# Patient Record
Sex: MALE | Race: WHITE | HISPANIC OR LATINO | URBAN - NONMETROPOLITAN AREA
[De-identification: names, ages, dates, MRNs, and addresses within clinical notes are randomized per-mention and may not be internally consistent; named-entity substitution may affect disease eponyms.]

---

## 2023-04-07 ENCOUNTER — OFFICE (OUTPATIENT)
Dept: URBAN - NONMETROPOLITAN AREA CLINIC 13 | Facility: CLINIC | Age: 33
End: 2023-04-07

## 2023-04-07 VITALS
HEART RATE: 73 BPM | OXYGEN SATURATION: 98 % | HEIGHT: 67 IN | SYSTOLIC BLOOD PRESSURE: 111 MMHG | DIASTOLIC BLOOD PRESSURE: 71 MMHG | WEIGHT: 153 LBS

## 2023-04-07 DIAGNOSIS — K21.9 GASTRO-ESOPHAGEAL REFLUX DISEASE WITHOUT ESOPHAGITIS: ICD-10-CM

## 2023-04-07 DIAGNOSIS — R10.13 EPIGASTRIC PAIN: ICD-10-CM

## 2023-04-07 LAB
AMYLASE: 88 UNITS/L (ref 30–110)
CBC WITH WBC (DIFF): ACANTHOCYTE: NORMAL
CBC WITH WBC (DIFF): AGRANULAR NEUTROPHIL: NORMAL
CBC WITH WBC (DIFF): ANISOCYTOSIS: NORMAL
CBC WITH WBC (DIFF): ATYPICAL LYMPHOCYTE: NORMAL
CBC WITH WBC (DIFF): AUER RODS: NORMAL
CBC WITH WBC (DIFF): BAND: NORMAL
CBC WITH WBC (DIFF): BASOPHIL: 0 % (ref 0–1)
CBC WITH WBC (DIFF): BASOPHILIC STIPPLING: NORMAL
CBC WITH WBC (DIFF): BI-LOBED NEUTROPHIL: NORMAL
CBC WITH WBC (DIFF): BLAST: NORMAL
CBC WITH WBC (DIFF): BURR CELL: NORMAL
CBC WITH WBC (DIFF): DIMORPHIC RBC POPULATION: NORMAL
CBC WITH WBC (DIFF): DOHLE BODIES: NORMAL
CBC WITH WBC (DIFF): ELLIPTOCYTE: NORMAL
CBC WITH WBC (DIFF): EOSINOPHIL: 2 % (ref 0–5)
CBC WITH WBC (DIFF): GIANT PLATELET: NORMAL
CBC WITH WBC (DIFF): HEMATOCRIT: 44.1 % (ref 41–53)
CBC WITH WBC (DIFF): HEMOGLOBIN: 14.4 G/DL (ref 14–18)
CBC WITH WBC (DIFF): HOWELL JOLLY BODIES: NORMAL
CBC WITH WBC (DIFF): HYPERSEGMENTED NEUTROPHIL: NORMAL
CBC WITH WBC (DIFF): HYPOCHROMIA: NORMAL
CBC WITH WBC (DIFF): HYPOGRANULAR NEUTROPHIL: NORMAL
CBC WITH WBC (DIFF): IMMATURE GRANULOCYTES: 0 % (ref 0–1)
CBC WITH WBC (DIFF): LARGE PLATELET: NORMAL
CBC WITH WBC (DIFF): LYMPHOCYTE: 20 % (ref 20–40)
CBC WITH WBC (DIFF): MACROCYTOSIS: NORMAL
CBC WITH WBC (DIFF): MEAN CELL HEMOGLOBIN CONCENTRATION: 32.7 G/DL — LOW (ref 33–37)
CBC WITH WBC (DIFF): MEAN CELL HEMOGLOBIN: 28.9 PG (ref 27–31)
CBC WITH WBC (DIFF): MEAN CELL VOLUME: 88 FL (ref 84–100)
CBC WITH WBC (DIFF): MEAN PLATELET VOLUME: 11.3 FL (ref 8.3–11.9)
CBC WITH WBC (DIFF): METAMYELOCYTE: NORMAL
CBC WITH WBC (DIFF): MICROCYTOSIS: NORMAL
CBC WITH WBC (DIFF): MIX CELLS: NORMAL
CBC WITH WBC (DIFF): MONOCYTE: 7 % (ref 1–10)
CBC WITH WBC (DIFF): MYELOCYTE: NORMAL
CBC WITH WBC (DIFF): NEUTROPHIL SEGMENTED: 69 % (ref 50–70)
CBC WITH WBC (DIFF): NOTE: NORMAL
CBC WITH WBC (DIFF): NUCLEATED RBC: 0 /100WBC (ref 0–0)
CBC WITH WBC (DIFF): OVALOCYTE: NORMAL
CBC WITH WBC (DIFF): PAPPENHEIMER BODIES: NORMAL
CBC WITH WBC (DIFF): PATHOLOGIST INTERPRETATION: NORMAL
CBC WITH WBC (DIFF): PLATELET CLUMPS: NORMAL
CBC WITH WBC (DIFF): PLATELET COUNT: 266 /NL (ref 140–440)
CBC WITH WBC (DIFF): PLT SATELLITOSIS: NORMAL
CBC WITH WBC (DIFF): POIKILOCYTOSIS: NORMAL
CBC WITH WBC (DIFF): POLYCHROMASIA: NORMAL
CBC WITH WBC (DIFF): PROMYELOCYTE: NORMAL
CBC WITH WBC (DIFF): RBC MORPHOLOGY: NORMAL
CBC WITH WBC (DIFF): REACT LYMPH ABS MAN: NORMAL
CBC WITH WBC (DIFF): REACTIVE LYMPHOCYTE: NORMAL
CBC WITH WBC (DIFF): RED BLOOD CELL: 4.99 /PL (ref 4.7–6.1)
CBC WITH WBC (DIFF): RED CELL DIAMETER WIDTH: 49 FL — HIGH (ref 35–48)
CBC WITH WBC (DIFF): ROULEAUX RBC: NORMAL
CBC WITH WBC (DIFF): SCHISTOCYTE: NORMAL
CBC WITH WBC (DIFF): SICKLE CELL: NORMAL
CBC WITH WBC (DIFF): SMUDGE CELLS: NORMAL
CBC WITH WBC (DIFF): SPHEROCYTE: NORMAL
CBC WITH WBC (DIFF): STOMATOCYTE: NORMAL
CBC WITH WBC (DIFF): TARGET CELL: NORMAL
CBC WITH WBC (DIFF): TEAR DROP CELL: NORMAL
CBC WITH WBC (DIFF): TOXIC GRANULATION: NORMAL
CBC WITH WBC (DIFF): UNCLASSIFIED CELL: NORMAL
CBC WITH WBC (DIFF): VACUOLATED NEUTROPHIL: NORMAL
CBC WITH WBC (DIFF): WBC MORPHOLOGY: NORMAL
CBC WITH WBC (DIFF): WHITE BLOOD CELL: 9.6 /NL (ref 4.8–11)
COMPREHENSIVE METABOLIC PANEL: ALBUMIN: 4 G/DL (ref 3.5–4.8)
COMPREHENSIVE METABOLIC PANEL: ALKALINE PHOSPHATASE: 67 UNITS/L (ref 36–126)
COMPREHENSIVE METABOLIC PANEL: ALT: 21 UNITS/L (ref ?–49)
COMPREHENSIVE METABOLIC PANEL: ANION GAP: 5 MMOL/L — LOW (ref 7–16)
COMPREHENSIVE METABOLIC PANEL: AST: 22 UNITS/L (ref 17–59)
COMPREHENSIVE METABOLIC PANEL: BILIRUBIN, TOTAL: 0.4 MG/DL (ref 0.2–1.3)
COMPREHENSIVE METABOLIC PANEL: BUN/CREATININE RATIO: 23.4
COMPREHENSIVE METABOLIC PANEL: CALCIUM: 9.1 MG/DL (ref 8.4–10.2)
COMPREHENSIVE METABOLIC PANEL: CARBON DIOXIDE: 31 MMOL/L — HIGH (ref 22–30)
COMPREHENSIVE METABOLIC PANEL: CHLORIDE: 107 MMOL/L (ref 98–107)
COMPREHENSIVE METABOLIC PANEL: CREATININE: 0.77 MG/DL (ref 0.66–1.25)
COMPREHENSIVE METABOLIC PANEL: GLUCOSE: 66 MG/DL — LOW (ref 75–110)
COMPREHENSIVE METABOLIC PANEL: POTASSIUM: 4.6 MMOL/L (ref 3.5–5.3)
COMPREHENSIVE METABOLIC PANEL: PROTEIN, TOTAL, SERUM: 6.8 G/DL (ref 6.3–8.2)
COMPREHENSIVE METABOLIC PANEL: SODIUM: 143 MMOL/L (ref 137–145)
COMPREHENSIVE METABOLIC PANEL: UREA NITROGEN (BUN): 18 MG/DL (ref 9–20)
GFR: EGFR AFRICAN AMERICAN: >60 ML/MIN/1.73M2
GFR: EGFR NON-AFR.AMERICAN: >60 ML/MIN/1.73M2
LIPASE: 83 UNITS/L (ref 23–300)

## 2023-04-07 PROCEDURE — 99203 OFFICE O/P NEW LOW 30 MIN: CPT | Performed by: NURSE PRACTITIONER

## 2024-06-11 ENCOUNTER — HOSPITAL ENCOUNTER (EMERGENCY)
Facility: HOSPITAL | Age: 34
Discharge: HOME | End: 2024-06-11
Attending: EMERGENCY MEDICINE

## 2024-06-11 VITALS
RESPIRATION RATE: 20 BRPM | HEIGHT: 66 IN | TEMPERATURE: 99 F | HEART RATE: 71 BPM | SYSTOLIC BLOOD PRESSURE: 122 MMHG | WEIGHT: 141.63 LBS | BODY MASS INDEX: 22.76 KG/M2 | OXYGEN SATURATION: 96 % | DIASTOLIC BLOOD PRESSURE: 65 MMHG

## 2024-06-11 DIAGNOSIS — A08.4 VIRAL GASTROENTERITIS: Primary | ICD-10-CM

## 2024-06-11 DIAGNOSIS — R19.7 NAUSEA, VOMITING, AND DIARRHEA: ICD-10-CM

## 2024-06-11 DIAGNOSIS — R11.2 NAUSEA, VOMITING, AND DIARRHEA: ICD-10-CM

## 2024-06-11 PROCEDURE — 99284 EMERGENCY DEPT VISIT MOD MDM: CPT

## 2024-06-11 PROCEDURE — 25000003 PHARM REV CODE 250

## 2024-06-11 RX ORDER — DICYCLOMINE HYDROCHLORIDE 20 MG/1
20 TABLET ORAL 2 TIMES DAILY PRN
Qty: 30 TABLET | Refills: 0 | Status: SHIPPED | OUTPATIENT
Start: 2024-06-11

## 2024-06-11 RX ORDER — ONDANSETRON 4 MG/1
4 TABLET, ORALLY DISINTEGRATING ORAL
Status: COMPLETED | OUTPATIENT
Start: 2024-06-11 | End: 2024-06-11

## 2024-06-11 RX ORDER — ONDANSETRON 4 MG/1
4 TABLET, ORALLY DISINTEGRATING ORAL EVERY 6 HOURS PRN
Qty: 15 TABLET | Refills: 0 | OUTPATIENT
Start: 2024-06-11 | End: 2024-06-21

## 2024-06-11 RX ORDER — ALUMINUM HYDROXIDE, MAGNESIUM HYDROXIDE, AND SIMETHICONE 1200; 120; 1200 MG/30ML; MG/30ML; MG/30ML
30 SUSPENSION ORAL ONCE
Status: COMPLETED | OUTPATIENT
Start: 2024-06-11 | End: 2024-06-11

## 2024-06-11 RX ORDER — ALUMINUM HYDROXIDE, MAGNESIUM HYDROXIDE, AND SIMETHICONE 2400; 240; 2400 MG/30ML; MG/30ML; MG/30ML
10 SUSPENSION ORAL EVERY 6 HOURS PRN
Qty: 100 ML | Refills: 0 | Status: SHIPPED | OUTPATIENT
Start: 2024-06-11 | End: 2025-06-11

## 2024-06-11 RX ORDER — LIDOCAINE HYDROCHLORIDE 20 MG/ML
15 SOLUTION OROPHARYNGEAL ONCE
Status: COMPLETED | OUTPATIENT
Start: 2024-06-11 | End: 2024-06-11

## 2024-06-11 RX ORDER — DICYCLOMINE HYDROCHLORIDE 10 MG/1
20 CAPSULE ORAL
Status: COMPLETED | OUTPATIENT
Start: 2024-06-11 | End: 2024-06-11

## 2024-06-11 RX ADMIN — ONDANSETRON 4 MG: 4 TABLET, ORALLY DISINTEGRATING ORAL at 05:06

## 2024-06-11 RX ADMIN — DICYCLOMINE HYDROCHLORIDE 20 MG: 10 CAPSULE ORAL at 05:06

## 2024-06-11 RX ADMIN — ALUMINUM HYDROXIDE, MAGNESIUM HYDROXIDE, AND SIMETHICONE 30 ML: 200; 200; 20 SUSPENSION ORAL at 05:06

## 2024-06-11 RX ADMIN — LIDOCAINE HYDROCHLORIDE 15 ML: 20 SOLUTION ORAL at 05:06

## 2024-06-11 NOTE — ED PROVIDER NOTES
Encounter Date: 6/11/2024       History     Chief Complaint   Patient presents with    Abdominal Pain    Nausea    Diarrhea    Vomiting     Pt presents to ER with complaints of abdominal pain (6/10) nausea vomiting and diarrhea. Pt denies CP and SOB. Pt states he took pepto but has had no relief. Pt denies any other issues at the time.      33-year-old male with no past medical history presents to the ED for nausea, vomiting, and diarrhea.  Patient states this started yesterday.  Patient complaining of epigastric cramping pain that is intermittent, he rates a 6/10.  He is tried Pepto-Bismol with little relief.  Denies anything making it worse.  Patient denies any sick contacts.  Patient denies any previous abdominal surgeries.  Patient admits to nausea, vomiting x6, and diarrhea x6.  Patient denies fever, sweats, chills, congestion, runny nose, sore throat, cough, shortness breath, chest pain, hematemesis, hematochezia, urinary symptoms, body aches, headaches, dizziness, and lightheadedness.      Review of patient's allergies indicates:  No Known Allergies  History reviewed. No pertinent past medical history.  History reviewed. No pertinent surgical history.  No family history on file.  Social History     Tobacco Use    Smoking status: Every Day     Types: Cigarettes    Smokeless tobacco: Never   Substance Use Topics    Alcohol use: Never    Drug use: Never     Review of Systems   Constitutional:  Negative for chills, diaphoresis and fever.   HENT:  Negative for congestion, rhinorrhea and sore throat.    Respiratory:  Negative for cough and shortness of breath.    Cardiovascular:  Negative for chest pain.   Gastrointestinal:  Positive for abdominal pain, diarrhea, nausea and vomiting. Negative for blood in stool and constipation.        (-) hematemesis   Genitourinary:  Negative for decreased urine volume, difficulty urinating, dysuria, frequency, hematuria and urgency.   Musculoskeletal:  Negative for myalgias.    Neurological:  Negative for dizziness, weakness, light-headedness and headaches.       Physical Exam     Initial Vitals [06/11/24 1640]   BP Pulse Resp Temp SpO2   122/65 71 20 98.5 °F (36.9 °C) 96 %      MAP       --         Physical Exam    Nursing note and vitals reviewed.  Constitutional: Vital signs are normal. He appears well-developed and well-nourished. He is not diaphoretic. He is active. He does not appear ill. No distress.   No vomiting in room   HENT:   Head: Normocephalic and atraumatic.   Right Ear: External ear normal.   Left Ear: External ear normal.   Nose: Nose normal.   Eyes: Conjunctivae, EOM and lids are normal. Pupils are equal, round, and reactive to light. Right eye exhibits no discharge. Left eye exhibits no discharge. No scleral icterus.   Neck: Phonation normal. Neck supple.   Normal range of motion.   Full passive range of motion without pain.     Cardiovascular:  Normal rate and regular rhythm.           Pulmonary/Chest: Effort normal and breath sounds normal. No respiratory distress.   Abdominal: Abdomen is soft. He exhibits no distension and no mass. There is abdominal tenderness in the epigastric area. There is no rebound and no guarding.   Musculoskeletal:         General: Normal range of motion.      Cervical back: Full passive range of motion without pain, normal range of motion and neck supple.     Neurological: He is alert and oriented to person, place, and time. He has normal strength. GCS eye subscore is 4. GCS verbal subscore is 5. GCS motor subscore is 6.   Skin: Skin is dry. Capillary refill takes less than 2 seconds.         ED Course   Procedures  Labs Reviewed - No data to display       Imaging Results    None          Medications   dicyclomine capsule 20 mg (20 mg Oral Given 6/11/24 1739)   ondansetron disintegrating tablet 4 mg (4 mg Oral Given 6/11/24 1740)   aluminum-magnesium hydroxide-simethicone 200-200-20 mg/5 mL suspension 30 mL (30 mLs Oral Given 6/11/24 1740)      And   LIDOcaine viscous HCl 2% oral solution 15 mL (15 mLs Oral Given 6/11/24 1740)     Medical Decision Making  33-year-old male with no past medical history presents to the ED for nausea, vomiting, and diarrhea.  Patient's chart and medical history reviewed.    Ddx:  Cholecystitis  Pancreatitis  Viral gastroenteritis  GERD  SBO  Colitis  Appendicitis    Patient's vitals reviewed.  Afebrile, no respiratory distress, and nontoxic-appearing in the ED. patient had mild epigastric tenderness palpation.  No guarding or rebound tenderness.  Patient would not like labs or IV today.  Patient given a GI cocktail, Zofran, and Bentyl for symptomatic control.  Patient states he is feeling much better.  Patient tolerated p.o. challenge.  Discussed with patient this is most likely a viral gastroenteritis which will take time to flush from his system.  Considered but unlikely appendicitis, no right lower quadrant pain, overall well-appearing, no fever.  Considered unlikely cholecystitis, no right upper quadrant pain, no fever, and overall well-appearing.  Considered but unlikely small-bowel obstruction, patient is still having bowel movements and passing gas.  Instructed patient to rest, stay well hydrated, and initiate a soft bowel diet, he verbalized understanding.  Patient be sent home with Bentyl, Zofran, and Mylanta for symptomatic control.  Patient will follow-up with his PCP. Patient agrees with this plan. Discussed with him strict return precautions, he verbalized understanding. Patient is stable for discharge.     Risk  OTC drugs.  Prescription drug management.                                      Clinical Impression:  Final diagnoses:  [A08.4] Viral gastroenteritis (Primary)  [R11.2, R19.7] Nausea, vomiting, and diarrhea          ED Disposition Condition    Discharge Stable          ED Prescriptions       Medication Sig Dispense Start Date End Date Auth. Provider    dicyclomine (BENTYL) 20 mg tablet Take 1 tablet  (20 mg total) by mouth 2 (two) times daily as needed (Abdominal pain). 30 tablet 6/11/2024 -- Holdsworth, Alayna, PA-C    ondansetron (ZOFRAN-ODT) 4 MG TbDL Take 1 tablet (4 mg total) by mouth every 6 (six) hours as needed (Zofran). 15 tablet 6/11/2024 -- Holdsworth, Alayna, PA-C    aluminum & magnesium hydroxide-simethicone (MYLANTA MAXIMUM STRENGTH) 400-400-40 mg/5 mL suspension Take 10 mLs by mouth every 6 (six) hours as needed for Indigestion. 100 mL 6/11/2024 6/11/2025 Holdsworth, Alayna, PA-C          Follow-up Information       Follow up With Specialties Details Why Contact Info    [facility] - Emergency Dept Emergency Medicine  If symptoms worsen [address] [facility] [place] [address] [phone]             Holdsworth, Alayna, PA-C  06/11/24 4313

## 2024-06-11 NOTE — DISCHARGE INSTRUCTIONS
Amber por venir a nuestro Departamento de Emergencias hoy. Es importante recordar que algunos problemas o condiciones médicas son difíciles de diagnosticar y es posible que no se encuentren o aborden rosa lopez visita al Departamento de Emergencias. Estas condiciones a menudo comienzan con síntomas inespecíficos y solo se pueden diagnosticar en visitas de seguimiento con lopez médico de atención primaria o especialista cuando los síntomas continúan o cambian. Recuerde que todas las condiciones médicas pueden cambiar y no podemos predecir cómo se sentirá mañana o al día siguiente. Regrese a la chelsea de emergencias si tiene preguntas/inquietudes, síntomas nuevos/preocupantes, empeoramiento o falta de mejora.    Asegúrese de hacer un seguimiento con lopez médico de atención primaria y revisar con ellos todos los laboratorios/imágenes/pruebas que se realizaron rosa lopez visita a la chelsea de emergencias. Es muy común que identifiquemos hallazgos incidentales no emergentes que deben ser objeto de seguimiento con lopez médico de atención primaria. Algunos laboratorios/imágenes/pruebas pueden estar fuera del rango normal y requieren un seguimiento que no sea de emergencia y/o más investigación/tratamiento/procedimientos/pruebas para ayudar a diagnosticar/excluir/prevenir complicaciones u otras afecciones médicas potencialmente graves. Algunas anormalidades pueden no lamberto sido discutidas o abordadas rosa lopez visita a la chelsea de emergencias. Es posible que algunos resultados de laboratorio no regresen rosa lopez visita a la chelsea de emergencias, gaviota se puede acceder a ellos descargando la aplicación gratuita [facility] o visitando [email or web address] Es importante que revise con lopez médico todas las pruebas de laboratorio/imágenes/pruebas que estén fuera del rango normal.    Kasia visita a la chelsea de emergencias no reemplaza kasia visita de atención primaria, y muchas pruebas de detección o de seguimiento no pueden ser  ordenadas por un médico de emergencia ni realizadas por la chelsea de emergencias. Algunas pruebas pueden incluso requerir aprobación previa.    Si no tiene un médico de atención primaria, puede comunicarse con el que figura en la documentación del carla o también puede llamar al mostrador de citas de la [facility] al [phone] o a [address] [place] [address] al [phone] para programar kasia katelyn. katelyn, o establecer atención con un médico de atención primaria o incluso un especialista y para obtener información sobre los recursos locales. Es importante para lopez liliana que tenga un médico de atención primaria.    [place] todos los medicamentos según las indicaciones. Hemos hecho todo lo posible para seleccionar un medicamento para usted que tratará lopez condición; sin embargo, todos los medicamentos pueden tener efectos secundarios y es imposible predecir qué medicamentos pueden causarle efectos secundarios o cuáles (si los hay) esos medicamentos le pueden alis. Si siente que está teniendo un efecto negativo o un efecto secundario de algún medicamento, debe dejar de zoltan esos medicamentos de inmediato y buscar atención médica. Si siente que está teniendo kasia reacción potencialmente mortal, llame al 911.    No conduzca, nade, suba a [place], se bañe, opere maquinaria pesada, gale alcohol o tome medicamentos potencialmente sedantes, firme ningún documento legal o tome decisiones importantes rosa 24 horas si ha recibido algún medicamento para el dolor, sedantes o alteradores del estado de ánimo. medicamentos rosa lopez visita a la chelsea de emergencias o dentro de las 24 horas de haberlos tomado si se los colon recetado.    Puede encontrar recursos adicionales para dentistas, audífonos, equipos médicos duraderos, farmacias de bajo costo y otros recursos en [email or web address]

## 2024-06-21 ENCOUNTER — HOSPITAL ENCOUNTER (EMERGENCY)
Facility: HOSPITAL | Age: 34
Discharge: HOME | End: 2024-06-21
Attending: EMERGENCY MEDICINE

## 2024-06-21 VITALS
SYSTOLIC BLOOD PRESSURE: 109 MMHG | TEMPERATURE: 99 F | DIASTOLIC BLOOD PRESSURE: 59 MMHG | HEIGHT: 66 IN | OXYGEN SATURATION: 99 % | HEART RATE: 72 BPM | RESPIRATION RATE: 18 BRPM | WEIGHT: 141 LBS | BODY MASS INDEX: 22.66 KG/M2

## 2024-06-21 DIAGNOSIS — U07.1 COVID-19: Primary | ICD-10-CM

## 2024-06-21 LAB
CTP QC/QA: YES
MOLECULAR STREP A: NEGATIVE
POC MOLECULAR INFLUENZA A AGN: NEGATIVE
POC MOLECULAR INFLUENZA B AGN: NEGATIVE
SARS-COV-2 RDRP RESP QL NAA+PROBE: POSITIVE

## 2024-06-21 PROCEDURE — 87502 INFLUENZA DNA AMP PROBE: CPT

## 2024-06-21 PROCEDURE — 87635 SARS-COV-2 COVID-19 AMP PRB: CPT

## 2024-06-21 PROCEDURE — 87651 STREP A DNA AMP PROBE: CPT

## 2024-06-21 PROCEDURE — 99284 EMERGENCY DEPT VISIT MOD MDM: CPT

## 2024-06-21 PROCEDURE — 25000003 PHARM REV CODE 250: Performed by: STUDENT IN AN ORGANIZED HEALTH CARE EDUCATION/TRAINING PROGRAM

## 2024-06-21 RX ORDER — ACETAMINOPHEN 500 MG
1000 TABLET ORAL
Status: COMPLETED | OUTPATIENT
Start: 2024-06-21 | End: 2024-06-21

## 2024-06-21 RX ORDER — BENZONATATE 100 MG/1
100 CAPSULE ORAL 3 TIMES DAILY PRN
Qty: 20 CAPSULE | Refills: 0 | Status: SHIPPED | OUTPATIENT
Start: 2024-06-21 | End: 2024-07-01

## 2024-06-21 RX ORDER — IBUPROFEN 800 MG/1
800 TABLET ORAL EVERY 6 HOURS PRN
Qty: 20 TABLET | Refills: 0 | Status: SHIPPED | OUTPATIENT
Start: 2024-06-21

## 2024-06-21 RX ORDER — ACETAMINOPHEN 500 MG
1000 TABLET ORAL EVERY 6 HOURS PRN
Qty: 20 TABLET | Refills: 0 | Status: SHIPPED | OUTPATIENT
Start: 2024-06-21 | End: 2024-06-26

## 2024-06-21 RX ORDER — ONDANSETRON 4 MG/1
4 TABLET, ORALLY DISINTEGRATING ORAL 2 TIMES DAILY
Qty: 12 TABLET | Refills: 0 | Status: SHIPPED | OUTPATIENT
Start: 2024-06-21

## 2024-06-21 RX ADMIN — ACETAMINOPHEN 1000 MG: 500 TABLET ORAL at 09:06

## 2024-06-21 NOTE — Clinical Note
"Christian"Tomasa Lockhart was seen and treated in our emergency department on 6/21/2024.     COVID-19 is present in our communities across the state. There is limited testing for COVID at this time, so not all patients can be tested. In this situation, your employee meets the following criteria:    Christian Lockhart has met the criteria for COVID-19 testing and has a POSITIVE result. He can return to work once they are asymptomatic for 24 hours without the use of fever reducing medications AND at least five days from the first positive result. A mask is recommended for 5 days post quarantine.     If you have any questions or concerns, or if I can be of further assistance, please do not hesitate to contact me.    Sincerely,             Clement Interiano MD"

## 2024-06-22 NOTE — ED PROVIDER NOTES
Encounter Date: 6/21/2024       History     Chief Complaint   Patient presents with    COVID-19 Concerns     C/o subjective fever, cough, chills, body aches x1 day.  Productive cough (green/yellow). Exposed to co-worker that was covid positive.      33-year-old male without significant past medical history presents emergency room for evaluation of 2 days of cough, congestion, diarrhea, fever and fatigue with associated body aches.  Cough is nonproductive COVID, no chronic lung disease.  Chest pain or shortness breath.  No abdominal pain.  No nausea or vomiting.    The history is provided by the patient. No  was used.     Review of patient's allergies indicates:  No Known Allergies  No past medical history on file.  No past surgical history on file.  No family history on file.  Social History     Tobacco Use    Smoking status: Every Day     Types: Cigarettes    Smokeless tobacco: Never   Substance Use Topics    Alcohol use: Never    Drug use: Never     Review of Systems   Constitutional:  Positive for fatigue and fever. Negative for chills.   HENT:  Positive for congestion. Negative for hearing loss, sore throat and trouble swallowing.    Eyes:  Negative for visual disturbance.   Respiratory:  Positive for cough. Negative for chest tightness and shortness of breath.    Cardiovascular:  Negative for chest pain.   Gastrointestinal:  Positive for diarrhea. Negative for abdominal pain and nausea.   Endocrine: Negative for polyuria.   Genitourinary:  Negative for difficulty urinating.   Musculoskeletal:  Positive for myalgias. Negative for arthralgias.   Skin:  Negative for rash.   Neurological:  Negative for dizziness and headaches.   Psychiatric/Behavioral:  The patient is not nervous/anxious.        Physical Exam     Initial Vitals [06/21/24 2030]   BP Pulse Resp Temp SpO2   (!) 109/59 72 18 99.2 °F (37.3 °C) 99 %      MAP       --         Physical Exam    Nursing note and vitals  reviewed.  Constitutional: He appears well-developed and well-nourished.   HENT:   Head: Normocephalic and atraumatic.   Bilateral nares boggy with clear mucoid discharge.    Posterior oropharynx erythematous without evidence mass or swelling.  Bilateral TM, EAC unremarkable.   Eyes: Conjunctivae and EOM are normal.   Neck: Neck supple.   Cardiovascular:  Normal rate, regular rhythm, normal heart sounds and intact distal pulses.           Pulmonary/Chest: Breath sounds normal. No respiratory distress.   Abdominal: Abdomen is soft. He exhibits no distension. There is no abdominal tenderness.   Musculoskeletal:         General: Normal range of motion.      Cervical back: Neck supple.     Neurological: He is alert and oriented to person, place, and time. GCS score is 15. GCS eye subscore is 4. GCS verbal subscore is 5. GCS motor subscore is 6.   Skin: Skin is warm and dry. Capillary refill takes less than 2 seconds.   Psychiatric: He has a normal mood and affect. His behavior is normal. Judgment and thought content normal.         ED Course   Procedures  Labs Reviewed   SARS-COV-2 RDRP GENE - Abnormal; Notable for the following components:       Result Value    POC Rapid COVID Positive (*)     All other components within normal limits   POCT INFLUENZA A/B MOLECULAR   POCT STREP A MOLECULAR          Imaging Results    None          Medications   acetaminophen tablet 1,000 mg (1,000 mg Oral Given 6/21/24 2111)     Medical Decision Making  33-year-old male presents emergency room today for evaluation of cough, congestion with associated myalgias, fatigue and diarrhea.  Patient is nontoxic well-appearing.  He is in no acute distress.  Exam notable for ENT findings suggestive of viral URI.  No chronic lung disease.  Lungs CTA, hypoxic and in no respiratory distress.  Doubt pneumonia.  Influenza, strep negative.  Point of care COVID positive.  This is day 2 of illness.  However given patient with no significant risk factors  for prolonged her severe illness, discussed treatment with symptomatic care.  Will Rx symptomatic medicines.  Encouraged follow-up with PCM.    Given patient presentation and workup, doubt emergent or surgical pathology necessitating consultation or admission from the emergency department.  I discussed the findings with the patient.  I discussed strict and strong return precautions. They will be discharged home in stable condition.    Amount and/or Complexity of Data Reviewed  Labs:  Decision-making details documented in ED Course.    Risk  OTC drugs.  Prescription drug management.               ED Course as of 06/21/24 2152 Fri Jun 21, 2024 2107 BP(!): 109/59 [AN]   2107 Temp: 99.2 °F (37.3 °C) [AN]   2107 Pulse: 72 [AN]   2107 Resp: 18 [AN]   2107 SpO2: 99 % [AN]   2118 Molecular Strep A, POC: Negative [AN]   2118 POC Molecular Influenza A Ag: Negative [AN]   2118 POC Molecular Influenza B Ag: Negative [AN]   2118 SARS-CoV-2 RNA, Amplification, Qual(!): Positive [AN]      ED Course User Index  [AN] Ben Wu PA-C                           Clinical Impression:  Final diagnoses:  [U07.1] COVID-19 (Primary)          ED Disposition Condition    Discharge Stable          ED Prescriptions       Medication Sig Dispense Start Date End Date Auth. Provider    acetaminophen (TYLENOL) 500 MG tablet Take 2 tablets (1,000 mg total) by mouth every 6 (six) hours as needed for Pain. 20 tablet 6/21/2024 6/26/2024 Ben Wu PA-C    ibuprofen (ADVIL,MOTRIN) 800 MG tablet Take 1 tablet (800 mg total) by mouth every 6 (six) hours as needed for Pain. 20 tablet 6/21/2024 -- Ben Wu PA-C    benzocaine-menthoL 6-10 mg lozenge Take 1 lozenge by mouth every 2 (two) hours as needed for Pain. 18 tablet 6/21/2024 -- Ben Wu PA-C    benzonatate (TESSALON) 100 MG capsule Take 1 capsule (100 mg total) by mouth 3 (three) times daily as needed for Cough. 20 capsule 6/21/2024 7/1/2024 Ben Wu PA-C     ondansetron (ZOFRAN-ODT) 4 MG TbDL Take 1 tablet (4 mg total) by mouth 2 (two) times daily. 12 tablet 6/21/2024 -- Ben Wu PA-C          Follow-up Information       Follow up With Specialties Details Why Contact Info    [facility] Emergency Dept Emergency Medicine Go to  As needed, If symptoms worsen [address] [facility] [place] [id number] [phone]    Primary Care Manager  Schedule an appointment as soon as possible for a visit in 1 week               Ben Wu PA-C  06/21/24 0609

## 2024-06-22 NOTE — DISCHARGE INSTRUCTIONS
You were evaluated and treated in the emergency department today. You were found to have a diagnoses that can be managed well at home, however that requires your commitment to getting better.    Diagnoses specific instructions:  Take medications as prescribed.  Drink plenty of water.  Follow up with your primary care provider.  Return emergency room for any new or worsening symptoms      If you received or are discharged with pain medicine or muscle relaxers, understand that they can make you sleepy or impair your judgement. Do not make important decisions, drink, drive, swim or perform any other tasks you would not otherwise perform while impaired for at least 24 hours after your last dose.      Ensure you follow up with your Primary Care Provider or any additional providers listed on this discharge sheet. While you may be healthy enough to go home today, I cannot predict the exact course of your diagnoses. It is important to remember that some problems are difficult to diagnose and may not be found during your first visit. As such, it is your responsibility to monitor symptoms, follow-up with another healthcare provider, or return to the emergency room for new or worsening concerns. Unless otherwise instructed, continue all home medications and any new medications prescribed to you in the Emergency Department.     General Maintenance for otherwise healthy adults: Ensure adequate hydration to prevent prolonged illness and recovery. This should include about 14-16 cups of water daily.  Monitor your caloric intake with a goal of obtaining and maintaining a healthy weight and BMI to help prevent the development of chronic and life-threatening medical conditions. Talk with your primary care provider about how to start healthy fitness habits and aim for a goal of 30 minutes to an hour of exercise 3-5 times a week. Avoid the use of tobacco, alcohol, and illicit drugs as these may be detrimental to your health goals.

## 2024-10-01 ENCOUNTER — HOSPITAL ENCOUNTER (EMERGENCY)
Facility: HOSPITAL | Age: 34
Discharge: HOME | End: 2024-10-01
Attending: EMERGENCY MEDICINE

## 2024-10-01 VITALS
SYSTOLIC BLOOD PRESSURE: 133 MMHG | RESPIRATION RATE: 18 BRPM | WEIGHT: 155 LBS | TEMPERATURE: 98 F | BODY MASS INDEX: 25.02 KG/M2 | HEART RATE: 77 BPM | DIASTOLIC BLOOD PRESSURE: 78 MMHG | OXYGEN SATURATION: 100 %

## 2024-10-01 DIAGNOSIS — M79.605 LEFT LEG PAIN: ICD-10-CM

## 2024-10-01 DIAGNOSIS — L03.116 CELLULITIS OF LEFT LOWER EXTREMITY: Primary | ICD-10-CM

## 2024-10-01 PROCEDURE — 99284 EMERGENCY DEPT VISIT MOD MDM: CPT | Mod: 25

## 2024-10-01 RX ORDER — DOXYCYCLINE 100 MG/1
100 CAPSULE ORAL 2 TIMES DAILY
Qty: 14 CAPSULE | Refills: 0 | Status: SHIPPED | OUTPATIENT
Start: 2024-10-01 | End: 2024-10-08

## 2024-10-01 NOTE — ED PROVIDER NOTES
Encounter Date: 10/1/2024       History     Chief Complaint   Patient presents with    Cellulitis     Left leg cellulitis that started last night. Pt does not recall injuring or hurting his leg.      Patient is a 33-year-old male with no past medical history who presents to the emergency department for evaluation of left leg pain, redness, swelling x 1 day.  Wife at bedside translating.  She reports no obvious injury, but states he was putting up a ceiling fan last night and may have bumped into something.  Denies fever.  Denies vomiting.  Denies numbness or tingling.  Denies history of blood clots.  Denies recent surgeries.  No history of gout.  No other complaints.    The history is provided by the patient.     Review of patient's allergies indicates:  No Known Allergies  History reviewed. No pertinent past medical history.  History reviewed. No pertinent surgical history.  No family history on file.  Social History     Tobacco Use    Smoking status: Every Day     Types: Cigarettes    Smokeless tobacco: Never   Substance Use Topics    Alcohol use: Never    Drug use: Never     Review of Systems   Constitutional:  Negative for chills and fever.   Respiratory:  Negative for shortness of breath.    Cardiovascular:  Negative for chest pain.   Gastrointestinal:  Negative for abdominal pain, diarrhea, nausea and vomiting.   Genitourinary:  Negative for decreased urine volume.   Musculoskeletal:  Positive for arthralgias and joint swelling. Negative for back pain, neck pain and neck stiffness.   Skin:  Positive for color change.   Neurological:  Negative for dizziness, light-headedness, numbness and headaches.       Physical Exam     Initial Vitals [10/01/24 1700]   BP Pulse Resp Temp SpO2   133/78 77 18 98.3 °F (36.8 °C) 100 %      MAP       --         Physical Exam    Nursing note and vitals reviewed.  Constitutional: He appears well-developed and well-nourished.   HENT:   Head: Normocephalic and atraumatic.   Right  Ear: External ear normal.   Left Ear: External ear normal.   Neck: Carotid bruit is not present.   Normal range of motion.  Cardiovascular:  Normal rate, regular rhythm, normal heart sounds and intact distal pulses.     Exam reveals no gallop and no friction rub.       No murmur heard.  Pulmonary/Chest: Breath sounds normal. No respiratory distress. He has no wheezes. He has no rhonchi. He has no rales.   Abdominal: Abdomen is soft. Bowel sounds are normal. He exhibits no distension. There is no abdominal tenderness. There is no rebound and no guarding.   Musculoskeletal:      Cervical back: Normal range of motion.      Comments: There is normal passive range of motion of the left lower extremity with minimal pain.  There is mild swelling with erythema to the anterior left lower extremity.  Area is warm to touch.  2+ DP pulse on the left side.  Neurovascularly intact.     Neurological: He is alert and oriented to person, place, and time. GCS score is 15. GCS eye subscore is 4. GCS verbal subscore is 5. GCS motor subscore is 6.   Psychiatric: He has a normal mood and affect.       ED Course   Procedures  Labs Reviewed - No data to display       Imaging Results              US Lower Extremity Veins Left (Final result)  Result time 10/01/24 19:04:17      Final result by Graciela Barrientos MD (10/01/24 19:04:17)                   Impression:      No evidence of left lower extremity deep venous thrombosis.      Electronically signed by: Graciela Barrientos MD  Date:    10/01/2024  Time:    19:04               Narrative:    EXAMINATION:  US LOWER EXTREMITY VEINS LEFT    CLINICAL HISTORY:  Pain in left leg    TECHNIQUE:  Duplex and color flow Doppler evaluation of the left lower extremity veins was performed.    COMPARISON:  None    FINDINGS:  No evidence of clot involving the bilateral common femoral veins or left greater saphenous, femoral, popliteal, peroneal, anterior and posterior tibial veins.  All venous structures  demonstrate normal respiratory phasicity and augment adequately.  No evidence of soft tissue mass or Baker's cyst.                                       X-Ray Tibia Fibula 2 View Left (Final result)  Result time 10/01/24 19:06:31      Final result by Graciela Barrientos MD (10/01/24 19:06:31)                   Impression:      No acute osseous abnormality identified.      Electronically signed by: Graciela Barrientos MD  Date:    10/01/2024  Time:    19:06               Narrative:    EXAMINATION:  XR TIBIA FIBULA 2 VIEW LEFT    CLINICAL HISTORY:  Pain in left leg    TECHNIQUE:  AP and lateral views of the left tibia and fibula were performed.    COMPARISON:  None.    FINDINGS:  No evidence of acute displaced fracture, dislocation, or osseous destructive process.  Soft tissue swelling is seen at the lateral aspect of the lower leg and ankle region.  Knee and ankle joint spaces are preserved.                                       Medications - No data to display  Medical Decision Making  This is an emergent evaluation of a 33-year-old male with no past medical history who presents to the emergency department for evaluation of left leg pain, redness, swelling x 1 day.    Patient looks well clinically. There is normal passive range of motion of the left lower extremity with minimal pain.  There is mild swelling with erythema to the anterior left lower extremity.  Area is warm to touch.  2+ DP pulse on the left side.  Neurovascularly intact. Regular rate rhythm without murmurs.  No carotid bruits appreciated on exam. Lungs are clear to auscultation bilaterally.  Abdomen is soft, nontender, non distended, with normal bowel sounds.     Differential diagnosis includes but is not limited to cellulitis, DVT, other skin infection.    Workup initiated with x-ray, ultrasound.  Vital signs, chart, labs, and/or imaging were all reviewed.  See ED course below and interpretations above. My overall impression is cellulitis. Will discharge  home with doxycycline and have patient follow closely with primary care. Patient is very well appearing, and in no acute distress. Vital signs are reassuring here in the emergency department, patient is afebrile, breathing comfortable, satting 100 % on room air. Patient/Caregiver is stable for discharge at this time.  Patient/Caregiver was informed of results and plan of care. Patient/Caregiver verbalized understanding of care plan. All questions and concerns were addressed. Discussed strict return precautions with the patient/caregiver. Instructed follow up with primary care provider within 1 week.      Jared Glover PA-C    DISCLAIMER: This note was prepared with [employer] voice recognition transcription software. Garbled syntax, mangled pronouns, and other bizarre constructions may be attributed to that software system.       Amount and/or Complexity of Data Reviewed  Radiology: ordered. Decision-making details documented in ED Course.    Risk  Prescription drug management.               ED Course as of 10/01/24 2100   Tue Oct 01, 2024   1730 BP: 133/78 [TM]   1730 Temp: 98.3 °F (36.8 °C) [TM]   1730 Pulse: 77 [TM]   1730 Resp: 18 [TM]   1730 SpO2: 100 % [TM]   1919 US Lower Extremity Veins Left  No evidence of left lower extremity deep venous thrombosis.         [TM]   1919 X-Ray Tibia Fibula 2 View Left  No acute osseous abnormality identified.      [TM]   1924 Suspect cellulitis.  Will discharge home with doxycycline and have patient follow closely with primary care. [TM]      ED Course User Index  [TM] Jared Glover PA-C                           Clinical Impression:  Final diagnoses:  [M79.605] Left leg pain  [L03.116] Cellulitis of left lower extremity (Primary)          ED Disposition Condition    Discharge Stable          ED Prescriptions       Medication Sig Dispense Start Date End Date Auth. Provider    doxycycline (VIBRAMYCIN) 100 MG Cap Take 1 capsule (100 mg total) by mouth 2 (two) times daily. for  7 days 14 capsule 10/1/2024 10/8/2024 Jared Glover PA-C          Follow-up Information       Follow up With Specialties Details Why Contact [facility] - Emergency Dept Emergency Medicine Go to  As needed, If symptoms worsen, or new symptoms develop [address] [facility] [place] [id number] [phone]    Primary care doctor  Schedule an appointment as soon as possible for a visit in 3 days               Jared Glover PA-C  10/01/24 2100

## 2024-10-01 NOTE — Clinical Note
"Christian"Tomasa Lockhart was seen and treated in our emergency department on 10/1/2024.  He may return to work on 10/04/2024.       If you have any questions or concerns, please don't hesitate to call.      Jared Glover PA-C"

## 2024-10-02 NOTE — DISCHARGE INSTRUCTIONS
ted fue atendido en el departamento de emergencias [place]. [place] todos los medicamentos según lo prescrito y ludwin comentamos.  Seamus un seguimiento con un especialista si así se lo indica.  Es importante recordar que algunos problemas son difíciles de diagnosticar y es posible que no se detecten rosa lopez visita al Departamento de Emergencias. Asegúrese de hacer un seguimiento con lopez médico de atención primaria y revisar con él todos los análisis de laboratorio, imágenes y pruebas que se realizaron rosa esta visita. Algunas pruebas/laboratorios pueden estar fuera del rango normal y requerir un seguimiento que no sea de emergencia y kasia investigación adicional para ayudar a diagnosticar/excluir/prevenir complicaciones u otras afecciones médicas. Regrese al departamento de emergencias ante cualquier síntoma nuevo o que empeore. Amber por permitirme cuidar de yumiko cunningham, fue un placer. ¡Espero que te sientas mejor la próxima vez!

## 2024-12-21 ENCOUNTER — HOSPITAL ENCOUNTER (EMERGENCY)
Facility: HOSPITAL | Age: 34
Discharge: HOME | End: 2024-12-21
Attending: EMERGENCY MEDICINE

## 2024-12-21 VITALS
TEMPERATURE: 98 F | HEART RATE: 62 BPM | HEIGHT: 67 IN | OXYGEN SATURATION: 97 % | DIASTOLIC BLOOD PRESSURE: 75 MMHG | BODY MASS INDEX: 26.68 KG/M2 | WEIGHT: 170 LBS | SYSTOLIC BLOOD PRESSURE: 129 MMHG | RESPIRATION RATE: 18 BRPM

## 2024-12-21 DIAGNOSIS — M54.16 ACUTE RADICULAR LOW BACK PAIN: Primary | ICD-10-CM

## 2024-12-21 PROCEDURE — 63600175 PHARM REV CODE 636 W HCPCS: Performed by: PHYSICIAN ASSISTANT

## 2024-12-21 PROCEDURE — 99284 EMERGENCY DEPT VISIT MOD MDM: CPT | Mod: 25

## 2024-12-21 PROCEDURE — 25000003 PHARM REV CODE 250: Performed by: PHYSICIAN ASSISTANT

## 2024-12-21 PROCEDURE — 96372 THER/PROPH/DIAG INJ SC/IM: CPT | Performed by: PHYSICIAN ASSISTANT

## 2024-12-21 RX ORDER — DIAZEPAM 5 MG/1
5 TABLET ORAL
Status: COMPLETED | OUTPATIENT
Start: 2024-12-21 | End: 2024-12-21

## 2024-12-21 RX ORDER — PREDNISONE 20 MG/1
60 TABLET ORAL DAILY
Qty: 9 TABLET | Refills: 0 | Status: SHIPPED | OUTPATIENT
Start: 2024-12-21 | End: 2024-12-24

## 2024-12-21 RX ORDER — ORPHENADRINE CITRATE 100 MG/1
100 TABLET, EXTENDED RELEASE ORAL 2 TIMES DAILY
Qty: 10 TABLET | Refills: 0 | Status: SHIPPED | OUTPATIENT
Start: 2024-12-21 | End: 2024-12-26

## 2024-12-21 RX ORDER — KETOROLAC TROMETHAMINE 30 MG/ML
30 INJECTION, SOLUTION INTRAMUSCULAR; INTRAVENOUS
Status: COMPLETED | OUTPATIENT
Start: 2024-12-21 | End: 2024-12-21

## 2024-12-21 RX ORDER — PREDNISONE 20 MG/1
60 TABLET ORAL
Status: COMPLETED | OUTPATIENT
Start: 2024-12-21 | End: 2024-12-21

## 2024-12-21 RX ORDER — MELOXICAM 7.5 MG/1
7.5 TABLET ORAL DAILY
Qty: 12 TABLET | Refills: 0 | Status: SHIPPED | OUTPATIENT
Start: 2024-12-21

## 2024-12-21 RX ADMIN — KETOROLAC TROMETHAMINE 30 MG: 30 INJECTION, SOLUTION INTRAMUSCULAR; INTRAVENOUS at 08:12

## 2024-12-21 RX ADMIN — DIAZEPAM 5 MG: 5 TABLET ORAL at 07:12

## 2024-12-21 RX ADMIN — PREDNISONE 60 MG: 20 TABLET ORAL at 08:12

## 2024-12-21 NOTE — Clinical Note
"Christian"Tomasa Lockhart was seen and treated in our emergency department on 12/21/2024.  He may return to work on 12/24/2024.       If you have any questions or concerns, please don't hesitate to call.      Addison Harrison PA-C"

## 2024-12-22 NOTE — ED PROVIDER NOTES
Encounter Date: 12/21/2024       History     Chief Complaint   Patient presents with    Tingling     Pt states that he has restless legs for 2 days. Pt states that he has had this earlier in the year and received medications for this.pt also with pain is lower back, pt also states the it hurts when he takes a deep breath. 9/10 pain. Pt with no obvious signs of distress.pt appears to be very anxiety spouse states that he is very anxious and has been pacing     34-year-old male with no pertinent past medical history presents to the emergency department for gradual onset atraumatic sharp and positional diffuse low back pain that intermittently radiates down bilateral lower extremities.  Works as an .  Denies urinary symptoms, incontinence, fever, nausea, and vomiting.  Temporary relief with Aleve, last taken earlier today.  No history of herniated disc or other spinal abnormality.  Here for pain control.    The history is provided by the patient. The history is limited by a language barrier. A  was used (wife).     Review of patient's allergies indicates:  No Known Allergies  No past medical history on file.  No past surgical history on file.  No family history on file.  Social History     Tobacco Use    Smoking status: Every Day     Types: Cigarettes    Smokeless tobacco: Never   Substance Use Topics    Alcohol use: Never    Drug use: Never     Review of Systems   Constitutional:  Negative for fever.   Respiratory:  Negative for shortness of breath.    Cardiovascular:  Negative for chest pain.   Gastrointestinal:  Negative for abdominal pain, nausea and vomiting.   Musculoskeletal:  Positive for back pain. Negative for joint swelling and neck pain.   Skin:  Negative for color change and wound.   Neurological:  Positive for numbness.   All other systems reviewed and are negative.      Physical Exam     Initial Vitals [12/21/24 1916]   BP Pulse Resp Temp SpO2   135/67 62 18 97.8 °F (36.6  "°C) 98 %      MAP       --         Physical Exam    Nursing note and vitals reviewed.  Constitutional: He appears well-developed and well-nourished. He is not diaphoretic. No distress.   HENT:   Head: Normocephalic and atraumatic.   Nose: Nose normal.   Eyes: Conjunctivae and EOM are normal. Right eye exhibits no discharge. Left eye exhibits no discharge.   Neck: No tracheal deviation present. No JVD present.   Normal range of motion.  Cardiovascular:  Normal rate and regular rhythm.           Pulmonary/Chest: No accessory muscle usage or stridor. No tachypnea. No respiratory distress.   Musculoskeletal:         General: Normal range of motion.      Cervical back: Normal range of motion.      Comments: Reproducible TTP over the b/l sciatic notch and diffuse lumbar musculature. No midline tenderness or bony deformities noted down the neck and spine. No bony TTP to the hips. (+) SLR to BLE. No lower extremity swelling. Distal lower extremity pulses 2+ and equal. No rash/skin lesions. No foot drop. Ambulatory.        Neurological: He is alert and oriented to person, place, and time. He displays no tremor. He displays no seizure activity. Coordination and gait normal.   Skin: Skin is warm and dry. No pallor.         ED Course   Procedures  Labs Reviewed - No data to display       Imaging Results    None          Medications   ketorolac injection 30 mg (has no administration in time range)   predniSONE tablet 60 mg (has no administration in time range)   diazePAM tablet 5 mg (5 mg Oral Given 12/21/24 1952)     Medical Decision Making    This is an emergent evaluation of a 34 y.o. male presenting to the ED for "sharp" low back pain that intermittently radiates down the BLE. Denies traumatic injury, Hx of IV drug use, fever, urinary retention/loss of bowel bladder control, urinary symptoms, and abdominal pain. Afebrile. Patient is non-toxic appearing and in no acute distress. Ambulatory. (+) SLR. No sensory or motor " deficit.     Presentation most consistent with acute lumbosacral radiculopathy with associated myofascial strain. No Hx of trauma to suggest acute vertebral fracture or warrant emergent imaging at this time. I doubt cauda equina, AAA rupture, and ureteral stone. I have a low suspicion for infectious etiology, including for epidural abscess and pyelonephritis. I have a lower suspicion for neoplastic etiology that will require emergent neurosurgical intervention today. Regardless, I do share decision making with patient and wife for screening xray of lumbar spine today; declines.     Will offer pain control in ED. Discharged home with supportive care. Instructed the patient to follow up with PCP. Also instructed to follow up with neurosurgery and/or orthopedics for reevaluation and management of symptoms. May benefit from MRI of lumbar spine as an outpatient if symptoms persist. An educational resource on sciatica is issued to the patient.     I discussed with the patient the diagnosis, treatment plan, indications for return to the emergency department, and for expected follow-up. The patient verbalized an understanding. The patient is asked if there are any questions or concerns. We discuss the case, until all issues are addressed to the patient's satisfaction. Patient understands and is agreeable to the plan.       Risk  Prescription drug management.                                      Clinical Impression:  Final diagnoses:  [M54.16] Acute radicular low back pain (Primary)          ED Disposition Condition    Discharge Stable          ED Prescriptions       Medication Sig Dispense Start Date End Date Auth. Provider    orphenadrine (NORFLEX) 100 mg tablet Take 1 tablet (100 mg total) by mouth 2 (two) times daily. for 5 days 10 tablet 12/21/2024 12/26/2024 Addison Harrison PA-C    meloxicam (MOBIC) 7.5 MG tablet Take 1 tablet (7.5 mg total) by mouth once daily. 12 tablet 12/21/2024 -- Addison Harrison PA-C     predniSONE (DELTASONE) 20 MG tablet Take 3 tablets (60 mg total) by mouth once daily. for 3 days 9 tablet 12/21/2024 12/24/2024 Addison Harrison PA-C          Follow-up Information       Follow up With Specialties Details Why Contact [facility] -  Schedule an appointment as soon as possible for a visit in 2 days For reevaluation, AND to establish primary if you don't have a  [address] [place] [address]  [phone]      [facility] NEUROSURGERY Neurosurgery Schedule an appointment as soon as possible for a visit in 1 day For further evaluation, For more definitive management of your symptoms [address] [facility] [place] [address] [phone]    [facility] Emergency Dept Emergency Medicine Go to  If symptoms worsen or new symptoms develop [address] [facility] [place] [id number] [phone]             Addison Harrison PA-C  12/21/24 1958